# Patient Record
Sex: MALE | Race: WHITE | URBAN - METROPOLITAN AREA
[De-identification: names, ages, dates, MRNs, and addresses within clinical notes are randomized per-mention and may not be internally consistent; named-entity substitution may affect disease eponyms.]

---

## 2017-04-28 ENCOUNTER — OFFICE VISIT (OUTPATIENT)
Dept: URGENT CARE | Facility: URGENT CARE | Age: 22
End: 2017-04-28
Payer: COMMERCIAL

## 2017-04-28 VITALS
RESPIRATION RATE: 15 BRPM | SYSTOLIC BLOOD PRESSURE: 118 MMHG | WEIGHT: 145 LBS | BODY MASS INDEX: 21.48 KG/M2 | DIASTOLIC BLOOD PRESSURE: 62 MMHG | HEIGHT: 69 IN | HEART RATE: 114 BPM | TEMPERATURE: 102.4 F | OXYGEN SATURATION: 98 %

## 2017-04-28 DIAGNOSIS — J03.90 TONSILLITIS: Primary | ICD-10-CM

## 2017-04-28 DIAGNOSIS — R07.0 THROAT PAIN: ICD-10-CM

## 2017-04-28 LAB
DEPRECATED S PYO AG THROAT QL EIA: NORMAL
HETEROPH AB SER QL: NEGATIVE
MICRO REPORT STATUS: NORMAL
SPECIMEN SOURCE: NORMAL

## 2017-04-28 PROCEDURE — 87880 STREP A ASSAY W/OPTIC: CPT | Performed by: INTERNAL MEDICINE

## 2017-04-28 PROCEDURE — 36415 COLL VENOUS BLD VENIPUNCTURE: CPT | Performed by: FAMILY MEDICINE

## 2017-04-28 PROCEDURE — 99202 OFFICE O/P NEW SF 15 MIN: CPT | Performed by: FAMILY MEDICINE

## 2017-04-28 PROCEDURE — 87081 CULTURE SCREEN ONLY: CPT | Performed by: INTERNAL MEDICINE

## 2017-04-28 PROCEDURE — 86308 HETEROPHILE ANTIBODY SCREEN: CPT | Performed by: FAMILY MEDICINE

## 2017-04-28 NOTE — MR AVS SNAPSHOT
"              After Visit Summary   4/28/2017    Kemi Leigh    MRN: [id number]           Patient Information     Date Of Birth          1995        Visit Information        Provider Department      4/28/2017 8:00 PM Luma Roper DO [facility]        Today's Diagnoses     Tonsillitis    -  1    Throat pain          Care Instructions    Start the antibiotic tonight.   If any fevers persist after 48 hours on the antibiotic, recheck with your primary provider.    If any new or worsening symptoms develop, return to care.         Follow-ups after your visit        Who to contact     If you have questions or need follow up information about today's clinic visit or your schedule please contact [facility] directly at [phone].  Normal or non-critical lab and imaging results will be communicated to you by MyChart, letter or phone within 4 business days after the clinic has received the results. If you do not hear from us within 7 days, please contact the clinic through MyChart or phone. If you have a critical or abnormal lab result, we will notify you by phone as soon as possible.  Submit refill requests through [employer] or call your pharmacy and they will forward the refill request to us. Please allow 3 business days for your refill to be completed.          Additional Information About Your Visit        [employer] Information     [employer] lets you send messages to your doctor, view your test results, renew your prescriptions, schedule appointments and more. To sign up, go to [email or web address] . Click on \"Log in\" on the left side of the screen, which will take you to the Welcome page. Then click on \"Sign up Now\" on the right side of the page.     You will be asked to enter the access code listed below, as well as some personal information. Please follow the directions to create your username and password.     Your access code is: " [email or web address]  Expires: 2017 10:10 PM     Your access code will  in 90 days. If you need help or a new code, please call your [facility] clinic or [phone].        Care EveryWhere ID     This is your Care EveryWhere ID. This could be used by other organizations to access your [facility] medical records  [id number]        Your Vitals Were     Pulse Temperature Respirations Height Pulse Oximetry BMI (Body Mass Index)    114 102.4  F (39.1  C) (Oral) 15 5' 9\" (1.753 m) 98% 21.41 kg/m2       Blood Pressure from Last 3 Encounters:   17 118/62    Weight from Last 3 Encounters:   17 145 lb (65.8 kg)              We Performed the Following     Beta strep group A culture     Mononucleosis screen     Strep, Rapid Screen          Today's Medication Changes          These changes are accurate as of: 17 10:10 PM.  If you have any questions, ask your nurse or doctor.               Start taking these medicines.        Dose/Directions    amoxicillin-clavulanate 875-125 MG per tablet   Commonly known as:  AUGMENTIN   Used for:  Tonsillitis   Started by:  Luma Roper,         Dose:  1 tablet   Take 1 tablet by mouth 2 times daily for 10 days   Quantity:  20 tablet   Refills:  0            Where to get your medicines      Some of these will need a paper prescription and others can be bought over the counter.  Ask your nurse if you have questions.     Bring a paper prescription for each of these medications     amoxicillin-clavulanate 875-125 MG per tablet                Primary Care Provider    None Specified       No primary provider on file.        Thank you!     Thank you for choosing [facility]  for your care. Our goal is always to provide you with excellent care. Hearing back from our patients is one way we can continue to improve our services. Please take a few minutes to complete the written survey that you may receive in the mail after your visit with us. Thank " you!             Your Updated Medication List - Protect others around you: Learn how to safely use, store and throw away your medicines at www.disposemymeds.org.          This list is accurate as of: 4/28/17 10:10 PM.  Always use your most recent med list.                   Brand Name Dispense Instructions for use    amoxicillin-clavulanate 875-125 MG per tablet    AUGMENTIN    20 tablet    Take 1 tablet by mouth 2 times daily for 10 days

## 2017-04-29 LAB
BACTERIA SPEC CULT: NORMAL
MICRO REPORT STATUS: NORMAL
SPECIMEN SOURCE: NORMAL

## 2017-04-29 NOTE — PATIENT INSTRUCTIONS
Start the antibiotic tonight.   If any fevers persist after 48 hours on the antibiotic, recheck with your primary provider.    If any new or worsening symptoms develop, return to care.

## 2017-04-29 NOTE — NURSING NOTE
"Chief Complaint   Patient presents with     Urgent Care     Pharyngitis     sick x 4 days, very sore throat and white spots.  fever       Initial /62  Pulse 114  Temp 102.4  F (39.1  C) (Oral)  Resp 15  Ht 5' 9\" (1.753 m)  Wt 145 lb (65.8 kg)  SpO2 98%  BMI 21.41 kg/m2 Estimated body mass index is 21.41 kg/(m^2) as calculated from the following:    Height as of this encounter: 5' 9\" (1.753 m).    Weight as of this encounter: 145 lb (65.8 kg).  Medication Reconciliation: complete  "

## 2017-04-29 NOTE — PROGRESS NOTES
"SUBJECTIVE:   Kemi Leigh is a 22 year old male presenting with a chief complaint of sore throat.  Symptoms started 4/25 with swollen neck glands, fatigue, headaches and sore throat.  Today when he looked in his throat he noticed white spots and has developed fevers.  No cold symptoms. He has not had mono before.  Concerned for strep.  Course of illness is: same.    Treatment measures tried:  OTC meds.  Last taken early this morning.  Predisposing factors include:  Non smoker    ROS:  5-Point Review of Systems Negative-- Except as stated above.    OBJECTIVE  /62  Pulse 114  Temp 102.4  F (39.1  C) (Oral)  Resp 15  Ht 5' 9\" (1.753 m)  Wt 145 lb (65.8 kg)  SpO2 98%  BMI 21.41 kg/m2  GENERAL:  Awake, alert and interactive. No acute distress.  HEENT:   NC/AT, EOMI, clear conjunctiva.  Clear nasal discharge.  Oropharynx moist and clear.  TM's and EAC's benign.  NECK: supple and free of adenopathy  CHEST:  Lungs are clear, no rhonchi, wheezing or rales. Normal symmetric air entry throughout both lung fields.   HEART:  S1 and S2 normal, no murmurs, clicks, gallops or rubs. Regular rate and rhythm.    Results for orders placed or performed in visit on 04/28/17   Mononucleosis screen   Result Value Ref Range    Mononucleosis Screen Negative NEG   Strep, Rapid Screen   Result Value Ref Range    Specimen Description Throat     Rapid Strep A Screen       NEGATIVE: No Group A streptococcal antigen detected by immunoassay, await   culture report.      Micro Report Status FINAL 04/28/2017          ASSESSMENT/PLAN    ICD-10-CM    1. Tonsillitis J03.90    2. Throat pain R07.0 Strep, Rapid Screen     Beta strep group A culture     Mononucleosis screen     600 mg ibuprofen given in the office.  Discussed mono test, only 3 days in so may be a false negative.   Will cover for bacterial tonsillitis with worsening and onset of fevers on day 3 of illness.   We discussed the expected course and symptomatic cares in " detail, including return to care if symptoms not improving as expected, do not resolve completely, or if any new or worsening symptoms develop.  Patient Instructions   Start the antibiotic tonight.   If any fevers persist after 48 hours on the antibiotic, recheck with your primary provider.    If any new or worsening symptoms develop, return to care.